# Patient Record
Sex: FEMALE | Race: WHITE
[De-identification: names, ages, dates, MRNs, and addresses within clinical notes are randomized per-mention and may not be internally consistent; named-entity substitution may affect disease eponyms.]

---

## 2018-02-15 ENCOUNTER — HOSPITAL ENCOUNTER (OUTPATIENT)
Dept: HOSPITAL 76 - LAB.F | Age: 63
Discharge: HOME | End: 2018-02-15
Attending: FAMILY MEDICINE
Payer: COMMERCIAL

## 2018-02-15 DIAGNOSIS — E78.5: Primary | ICD-10-CM

## 2018-02-15 LAB
ALBUMIN DIAFP-MCNC: 4.4 G/DL (ref 3.2–5.5)
ALBUMIN/GLOB SERPL: 1.8 {RATIO} (ref 1–2.2)
ALP SERPL-CCNC: 51 IU/L (ref 42–121)
ALT SERPL W P-5'-P-CCNC: 19 IU/L (ref 10–60)
ANION GAP SERPL CALCULATED.4IONS-SCNC: 4 MMOL/L (ref 6–13)
AST SERPL W P-5'-P-CCNC: 18 IU/L (ref 10–42)
BILIRUB BLD-MCNC: 0.7 MG/DL (ref 0.2–1)
BUN SERPL-MCNC: 15 MG/DL (ref 6–20)
CALCIUM UR-MCNC: 9 MG/DL (ref 8.5–10.3)
CHLORIDE SERPL-SCNC: 105 MMOL/L (ref 101–111)
CHOLEST SERPL-MCNC: 169 MG/DL
CO2 SERPL-SCNC: 29 MMOL/L (ref 21–32)
CREAT SERPLBLD-SCNC: 0.7 MG/DL (ref 0.4–1)
GFRSERPLBLD MDRD-ARVRAT: 84 ML/MIN/{1.73_M2} (ref 89–?)
GLOBULIN SER-MCNC: 2.5 G/DL (ref 2.1–4.2)
GLUCOSE SERPL-MCNC: 95 MG/DL (ref 70–100)
HDLC SERPL-MCNC: 57 MG/DL
HDLC SERPL: 3 {RATIO} (ref ?–4.4)
LDLC SERPL CALC-MCNC: 95 MG/DL
LDLC/HDLC SERPL: 1.7 {RATIO} (ref ?–4.4)
PROT SPEC-MCNC: 6.9 G/DL (ref 6.7–8.2)
SODIUM SERPLBLD-SCNC: 138 MMOL/L (ref 135–145)
VLDLC SERPL-SCNC: 17 MG/DL

## 2018-02-15 PROCEDURE — 80061 LIPID PANEL: CPT

## 2018-02-15 PROCEDURE — 83721 ASSAY OF BLOOD LIPOPROTEIN: CPT

## 2018-02-15 PROCEDURE — 80053 COMPREHEN METABOLIC PANEL: CPT

## 2018-02-15 PROCEDURE — 36415 COLL VENOUS BLD VENIPUNCTURE: CPT

## 2018-03-02 ENCOUNTER — HOSPITAL ENCOUNTER (OUTPATIENT)
Dept: HOSPITAL 76 - LAB.WCP | Age: 63
Discharge: HOME | End: 2018-03-02
Attending: FAMILY MEDICINE
Payer: COMMERCIAL

## 2018-03-02 DIAGNOSIS — R30.0: Primary | ICD-10-CM

## 2018-03-02 PROCEDURE — 87086 URINE CULTURE/COLONY COUNT: CPT

## 2018-03-20 ENCOUNTER — HOSPITAL ENCOUNTER (OUTPATIENT)
Dept: HOSPITAL 76 - DI.N | Age: 63
Discharge: HOME | End: 2018-03-20
Attending: FAMILY MEDICINE
Payer: COMMERCIAL

## 2018-03-20 DIAGNOSIS — Z12.31: Primary | ICD-10-CM

## 2018-03-20 PROCEDURE — 77067 SCR MAMMO BI INCL CAD: CPT

## 2018-04-02 NOTE — MAMMOGRAPHY REPORT
DIGITAL SCREENING MAMMOGRAM:  03/20/2018

 

COMPARISON: Prior reports from [facility] are available. 

Unfortunately, no images are available.

 

TECHNIQUE:  Bilateral digital CC, exaggerated CC, and MLO projections.

 

FINDINGS: The breast tissue is heterogeneously dense.  There are no dominant 
mass, 

architectural distortion, skin thickening, suspicious microcalcifications, or 
other abnormality.

 

IMPRESSION:  NEGATIVE BI-RADS 1.  



SUGGEST ROUTINE SCREENING IN 12 MONTHS.  IF PRIOR IMAGES BECOME AVAILABLE, 

A COMPARISON REPORT CAN BE ISSUED.



STANDARD QUALIFYING STATEMENTS

1.  This examination was reviewed with the aid of Computer-Aided Detection (CAD)
.

2.  A negative or benign imaging report should not delay biopsy if clinically 
suspicious 

findings are present.  Consider surgical consultation if warranted.  More than 5
% of cancers 

are not identified by imaging.

3.  Dense breasts may obscure an underlying neoplasm.

 

 

 

DD: 04/02/2018 13:52

TD: 04/02/2018 14:14

Job #: [id number]

EVA

## 2018-07-05 ENCOUNTER — HOSPITAL ENCOUNTER (OUTPATIENT)
Dept: HOSPITAL 76 - LAB.R | Age: 63
End: 2018-07-05
Attending: FAMILY MEDICINE
Payer: COMMERCIAL

## 2018-07-05 DIAGNOSIS — N39.0: Primary | ICD-10-CM

## 2018-07-05 PROCEDURE — 87086 URINE CULTURE/COLONY COUNT: CPT

## 2018-10-18 ENCOUNTER — HOSPITAL ENCOUNTER (OUTPATIENT)
Dept: HOSPITAL 76 - LAB | Age: 63
Discharge: HOME | End: 2018-10-18
Attending: OBSTETRICS & GYNECOLOGY
Payer: MEDICARE

## 2018-10-18 DIAGNOSIS — N30.90: Primary | ICD-10-CM

## 2018-10-18 LAB
EST. AVERAGE GLUCOSE BLD GHB EST-MCNC: 117 MG/DL (ref 70–100)
HB2 TOTAL: 14 G/DL
HBA1C BLD-MCNC: 0.55 G/DL
HEMOGLOBIN A1C %: 5.7 % (ref 4.6–6.2)

## 2018-10-18 PROCEDURE — 83036 HEMOGLOBIN GLYCOSYLATED A1C: CPT

## 2018-10-18 PROCEDURE — 36415 COLL VENOUS BLD VENIPUNCTURE: CPT
